# Patient Record
Sex: FEMALE | Race: WHITE | Employment: OTHER | URBAN - METROPOLITAN AREA
[De-identification: names, ages, dates, MRNs, and addresses within clinical notes are randomized per-mention and may not be internally consistent; named-entity substitution may affect disease eponyms.]

---

## 2017-02-13 ENCOUNTER — APPOINTMENT (OUTPATIENT)
Dept: GENERAL RADIOLOGY | Age: 77
End: 2017-02-13
Attending: NURSE PRACTITIONER
Payer: MEDICARE

## 2017-02-13 ENCOUNTER — HOSPITAL ENCOUNTER (OUTPATIENT)
Age: 77
Setting detail: OBSERVATION
Discharge: HOME | End: 2017-02-14
Attending: INTERNAL MEDICINE | Admitting: INTERNAL MEDICINE
Payer: MEDICARE

## 2017-02-13 PROBLEM — Z95.0 S/P CARDIAC PACEMAKER PROCEDURE: Status: ACTIVE | Noted: 2017-02-13

## 2017-02-13 PROBLEM — Z91.199 MEDICALLY NONCOMPLIANT: Status: ACTIVE | Noted: 2017-02-13

## 2017-02-13 PROBLEM — I44.2 CHB (COMPLETE HEART BLOCK) (HCC): Status: ACTIVE | Noted: 2017-02-13

## 2017-02-13 LAB
GLUCOSE BLD STRIP.AUTO-MCNC: 101 MG/DL (ref 65–100)
GLUCOSE BLD STRIP.AUTO-MCNC: 80 MG/DL (ref 65–100)
GLUCOSE BLD STRIP.AUTO-MCNC: 86 MG/DL (ref 65–100)
GLUCOSE BLD STRIP.AUTO-MCNC: 95 MG/DL (ref 65–100)
SERVICE CMNT-IMP: ABNORMAL
SERVICE CMNT-IMP: NORMAL

## 2017-02-13 PROCEDURE — 74011250636 HC RX REV CODE- 250/636: Performed by: NURSE PRACTITIONER

## 2017-02-13 PROCEDURE — 73562 X-RAY EXAM OF KNEE 3: CPT

## 2017-02-13 PROCEDURE — 65390000012 HC CONDITION CODE 44 OBSERVATION

## 2017-02-13 PROCEDURE — C1785 PMKR, DUAL, RATE-RESP: HCPCS

## 2017-02-13 PROCEDURE — 77030011640 HC PAD GRND REM COVD -A

## 2017-02-13 PROCEDURE — 82962 GLUCOSE BLOOD TEST: CPT

## 2017-02-13 PROCEDURE — 99152 MOD SED SAME PHYS/QHP 5/>YRS: CPT

## 2017-02-13 PROCEDURE — 77030031139 HC SUT VCRL2 J&J -A

## 2017-02-13 PROCEDURE — 33228 REMV&REPLC PM GEN DUAL LEAD: CPT

## 2017-02-13 PROCEDURE — 77030018729 HC ELECTRD DEFIB PAD CARD -B

## 2017-02-13 PROCEDURE — 74011250637 HC RX REV CODE- 250/637: Performed by: INTERNAL MEDICINE

## 2017-02-13 PROCEDURE — 51798 US URINE CAPACITY MEASURE: CPT

## 2017-02-13 PROCEDURE — 74011250636 HC RX REV CODE- 250/636

## 2017-02-13 PROCEDURE — 77030002996 HC SUT SLK J&J -A

## 2017-02-13 PROCEDURE — 77030028698 HC BLD TISS PLSM MEDT -D

## 2017-02-13 PROCEDURE — 77030018836 HC SOL IRR NACL ICUM -A

## 2017-02-13 PROCEDURE — 74011000250 HC RX REV CODE- 250

## 2017-02-13 PROCEDURE — 99218 HC RM OBSERVATION: CPT

## 2017-02-13 PROCEDURE — 74011250637 HC RX REV CODE- 250/637: Performed by: NURSE PRACTITIONER

## 2017-02-13 RX ORDER — HEPARIN SODIUM 200 [USP'U]/100ML
500 INJECTION, SOLUTION INTRAVENOUS ONCE
Status: COMPLETED | OUTPATIENT
Start: 2017-02-13 | End: 2017-02-13

## 2017-02-13 RX ORDER — HYDROCODONE BITARTRATE AND ACETAMINOPHEN 5; 325 MG/1; MG/1
1 TABLET ORAL ONCE
Status: COMPLETED | OUTPATIENT
Start: 2017-02-13 | End: 2017-02-13

## 2017-02-13 RX ORDER — ACETAMINOPHEN 325 MG/1
650 TABLET ORAL
Status: DISCONTINUED | OUTPATIENT
Start: 2017-02-13 | End: 2017-02-14 | Stop reason: HOSPADM

## 2017-02-13 RX ORDER — LIDOCAINE HYDROCHLORIDE 10 MG/ML
1-20 INJECTION INFILTRATION; PERINEURAL ONCE
Status: COMPLETED | OUTPATIENT
Start: 2017-02-13 | End: 2017-02-13

## 2017-02-13 RX ORDER — MIDAZOLAM HYDROCHLORIDE 1 MG/ML
INJECTION, SOLUTION INTRAMUSCULAR; INTRAVENOUS
Status: COMPLETED
Start: 2017-02-13 | End: 2017-02-13

## 2017-02-13 RX ORDER — BACITRACIN 50000 [IU]/1
50000 INJECTION, POWDER, FOR SOLUTION INTRAMUSCULAR ONCE
Status: COMPLETED | OUTPATIENT
Start: 2017-02-13 | End: 2017-02-13

## 2017-02-13 RX ORDER — FENTANYL CITRATE 50 UG/ML
INJECTION, SOLUTION INTRAMUSCULAR; INTRAVENOUS
Status: COMPLETED
Start: 2017-02-13 | End: 2017-02-13

## 2017-02-13 RX ORDER — SODIUM CHLORIDE 0.9 % (FLUSH) 0.9 %
5-10 SYRINGE (ML) INJECTION EVERY 8 HOURS
Status: DISCONTINUED | OUTPATIENT
Start: 2017-02-13 | End: 2017-02-14 | Stop reason: HOSPADM

## 2017-02-13 RX ORDER — BACITRACIN 50000 [IU]/1
INJECTION, POWDER, FOR SOLUTION INTRAMUSCULAR
Status: COMPLETED
Start: 2017-02-13 | End: 2017-02-13

## 2017-02-13 RX ORDER — SODIUM CHLORIDE 9 MG/ML
50 INJECTION, SOLUTION INTRAVENOUS CONTINUOUS
Status: DISPENSED | OUTPATIENT
Start: 2017-02-13 | End: 2017-02-14

## 2017-02-13 RX ORDER — HEPARIN SODIUM 200 [USP'U]/100ML
INJECTION, SOLUTION INTRAVENOUS
Status: COMPLETED
Start: 2017-02-13 | End: 2017-02-13

## 2017-02-13 RX ORDER — MIDAZOLAM HYDROCHLORIDE 1 MG/ML
.5-2 INJECTION, SOLUTION INTRAMUSCULAR; INTRAVENOUS
Status: DISCONTINUED | OUTPATIENT
Start: 2017-02-13 | End: 2017-02-13

## 2017-02-13 RX ORDER — SODIUM CHLORIDE 0.9 % (FLUSH) 0.9 %
10 SYRINGE (ML) INJECTION EVERY 8 HOURS
Status: DISCONTINUED | OUTPATIENT
Start: 2017-02-13 | End: 2017-02-14 | Stop reason: HOSPADM

## 2017-02-13 RX ORDER — LIDOCAINE HYDROCHLORIDE 10 MG/ML
INJECTION INFILTRATION; PERINEURAL
Status: COMPLETED
Start: 2017-02-13 | End: 2017-02-13

## 2017-02-13 RX ORDER — ONDANSETRON 2 MG/ML
4 INJECTION INTRAMUSCULAR; INTRAVENOUS
Status: DISCONTINUED | OUTPATIENT
Start: 2017-02-13 | End: 2017-02-14 | Stop reason: HOSPADM

## 2017-02-13 RX ORDER — VANCOMYCIN HYDROCHLORIDE 1 G/20ML
INJECTION, POWDER, LYOPHILIZED, FOR SOLUTION INTRAVENOUS
Status: DISCONTINUED
Start: 2017-02-13 | End: 2017-02-14 | Stop reason: HOSPADM

## 2017-02-13 RX ORDER — SODIUM CHLORIDE 0.9 % (FLUSH) 0.9 %
5-10 SYRINGE (ML) INJECTION AS NEEDED
Status: DISCONTINUED | OUTPATIENT
Start: 2017-02-13 | End: 2017-02-14 | Stop reason: HOSPADM

## 2017-02-13 RX ORDER — SODIUM CHLORIDE 900 MG/100ML
INJECTION INTRAVENOUS
Status: DISCONTINUED
Start: 2017-02-13 | End: 2017-02-14 | Stop reason: HOSPADM

## 2017-02-13 RX ORDER — FENTANYL CITRATE 50 UG/ML
25-50 INJECTION, SOLUTION INTRAMUSCULAR; INTRAVENOUS
Status: DISCONTINUED | OUTPATIENT
Start: 2017-02-13 | End: 2017-02-13

## 2017-02-13 RX ORDER — OXYCODONE AND ACETAMINOPHEN 5; 325 MG/1; MG/1
1 TABLET ORAL
Status: DISCONTINUED | OUTPATIENT
Start: 2017-02-13 | End: 2017-02-14 | Stop reason: HOSPADM

## 2017-02-13 RX ORDER — ATORVASTATIN CALCIUM 20 MG/1
20 TABLET, FILM COATED ORAL DAILY
Qty: 30 TAB | Refills: 11 | Status: SHIPPED | OUTPATIENT
Start: 2017-02-13

## 2017-02-13 RX ORDER — ACETAMINOPHEN 325 MG/1
TABLET ORAL
Status: DISPENSED
Start: 2017-02-13 | End: 2017-02-13

## 2017-02-13 RX ADMIN — MIDAZOLAM HYDROCHLORIDE 2 MG: 1 INJECTION INTRAMUSCULAR; INTRAVENOUS at 09:12

## 2017-02-13 RX ADMIN — ACETAMINOPHEN 650 MG: 325 TABLET, FILM COATED ORAL at 14:12

## 2017-02-13 RX ADMIN — MIDAZOLAM HYDROCHLORIDE 1 MG: 1 INJECTION, SOLUTION INTRAMUSCULAR; INTRAVENOUS at 09:20

## 2017-02-13 RX ADMIN — HEPARIN SODIUM 1000 UNITS: 200 INJECTION, SOLUTION INTRAVENOUS at 09:12

## 2017-02-13 RX ADMIN — LIDOCAINE HYDROCHLORIDE 20 ML: 10 INJECTION INFILTRATION; PERINEURAL at 09:20

## 2017-02-13 RX ADMIN — FENTANYL CITRATE 50 MCG: 50 INJECTION, SOLUTION INTRAMUSCULAR; INTRAVENOUS at 09:19

## 2017-02-13 RX ADMIN — MIDAZOLAM HYDROCHLORIDE 1 MG: 1 INJECTION, SOLUTION INTRAMUSCULAR; INTRAVENOUS at 09:26

## 2017-02-13 RX ADMIN — BACITRACIN 50000 UNITS: 5000 INJECTION, POWDER, FOR SOLUTION INTRAMUSCULAR at 09:23

## 2017-02-13 RX ADMIN — OXYCODONE HYDROCHLORIDE AND ACETAMINOPHEN 1 TABLET: 5; 325 TABLET ORAL at 20:31

## 2017-02-13 RX ADMIN — SODIUM CHLORIDE 250 ML: 900 INJECTION, SOLUTION INTRAVENOUS at 16:32

## 2017-02-13 RX ADMIN — MIDAZOLAM HYDROCHLORIDE 2 MG: 1 INJECTION, SOLUTION INTRAMUSCULAR; INTRAVENOUS at 09:12

## 2017-02-13 RX ADMIN — MIDAZOLAM HYDROCHLORIDE 1 MG: 1 INJECTION, SOLUTION INTRAMUSCULAR; INTRAVENOUS at 09:19

## 2017-02-13 RX ADMIN — LIDOCAINE HYDROCHLORIDE 20 ML: 10 INJECTION, SOLUTION INFILTRATION; PERINEURAL at 09:20

## 2017-02-13 RX ADMIN — FENTANYL CITRATE 50 MCG: 50 INJECTION, SOLUTION INTRAMUSCULAR; INTRAVENOUS at 09:12

## 2017-02-13 RX ADMIN — HYDROCODONE BITARTRATE AND ACETAMINOPHEN 1 TABLET: 5; 325 TABLET ORAL at 11:39

## 2017-02-13 RX ADMIN — BACITRACIN 50000 UNITS: 50000 INJECTION, POWDER, FOR SOLUTION INTRAMUSCULAR at 09:23

## 2017-02-13 RX ADMIN — Medication 10 ML: at 08:35

## 2017-02-13 RX ADMIN — ACETAMINOPHEN 650 MG: 325 TABLET, FILM COATED ORAL at 01:00

## 2017-02-13 RX ADMIN — SODIUM CHLORIDE 1000 MG: 900 INJECTION, SOLUTION INTRAVENOUS at 08:51

## 2017-02-13 NOTE — DISCHARGE INSTRUCTIONS
[address] [place] [address]  [phone]        ICD/PACEMAKER DISCHARGE INSTRUCTIONS    Patient ID:  Katie Hernandez  [id number]  77 y.o.  1940    Admit Date: 2/13/2017    Discharge Date: 2/13/2017     Admitting Physician: Hung Vega MD     Discharge Physician: Armani Jenkins NP    Admission Diagnoses:   Pacemaker malfunction  incomplete heart block  CHB (complete heart block) [facility])    Discharge Diagnoses:   Principal Problem:    CHB (complete heart block) ([facility] [address] (2/13/2017)    Active Problems:    CAD in native artery (11/18/2014)      HTN (hypertension) (11/18/2014)      Diabetes mellitus ([facility] [address] (11/18/2014)      S/P cardiac pacemaker procedure (2/13/2017)      Overview: 2/13/17 Medtronic dual chamber pacemaker generator change      Medically noncompliant (2/13/2017)      Overview: Failed to f/u with Cardiology        Discharge Condition: Good    Cardiology Procedures this Admission:  Medtronic dual chamber pacemaker generator changes    Disposition: home    Reference discharge instructions provided by nursing for diet and activity. Follow-up with Dr. Zane Del Angel in 2 weeks. Please contact the office for an appointment at [phone]. Signed:  Armani Jenkins NP  2/13/2017  2:05 PM    DISCHARGE INSTRUCTIONS FOR PATIENTS WITH ICD'S AND PACEMAKERS    1. Carry you ID card for your ICD/Pacemaker with you at all times. This card will be given to you in the hospital or mailed to you. 2. Medic Alert Bracelets are available from your pharmacist to wear at all times. 3. Call for an appointment in 2 weeks [phone]. 4. The pacemaker will bulge slightly under your skin. An ICD bulges a little more because it is larger. The bulge will decrease in size over the next few weeks. Please notify the doctor's office if you notice any of the following around your ICD site:  A.  A bruise that does not go away  B.   Soreness or yellow, green, or brown drainage from the site.  C. Any swelling from the site. D. If you have a fever of 100 degrees or higher that lasts for a few days    INCISION CARE       1.  Leave dressing over your site until you see the doctor. 2.  Leave steri-strips over your site until they start to fall off.   3.   You may shower after as long as your incision isnt submerged or directly sprayed upon until well healed. 4.  For comfort, wear loose fitting clothing. 5.  Ice pack to affected shoulder for first 24 hours, wear your sling for 2 days. 6.  Report any signs of infection, fever, pain, swelling, redness, oozing, or heat at site especially if these symptoms increase after the first 3 to 4 days. ACTIVITY PRECAUTIONS     1. Avoid rough contact with the implant site. 2. No driving for 14 days. 3. Avoid lifting your arm over your head, carrying anything on the affected side, or lifting over 10 pounds for 30 days. For the first 2 days only bend your arm at the elbow. 4. Any extreme activity such as golf, weight lifting or exercise biking should be restricted for 60 days. 5. Do not carry objects by holding them against your implant site. 6.  No shooting rifles or any type of gun with the affected shoulder permanently. 7.  If you have an ICD, welding and chainsaws are prohibited. SPECIAL PRECAUTIONS     1. You should avoid all strong magnetic fields, such as arc welding, large transformers, large motors. Some ICD devices will beep if it detects a strong magnet. If this occurs, move out of the area. 2.  You may not have an MRI. 3.  Treatments or surgery that requires diathermy or electrocautery should be discussed with your doctor before scheduled. 4. Avoid radio frequency transmitters, including radar. 5. Advise dentist or other medical personnel you see that you have a pacemaker or ICD. 6.  Cell phones and microwave oven use is okay.   7.  If you plan to move or take a trip to a new area, the doctor's office will give you a name of a doctor to contact for any problems. SPECIAL INSTRUCTIONS ON SHOCKS   1. Notify your doctor for any of the following:      A. Anytime a shock is received in a 24 hour period. An office visit is not usually required for a single shock. B.  Two or more shocks in a row. If you do not feel well, call the [facility], otherwise call your doctor. This may require an office visit. C. Two or more shocks spaced apart by several hours. This may require an office visit. 2.  Keep a record of events. Include date, time, symptoms and activity at that time. ANTIBIOTIC THERAPY    During the first 8 weeks after your pacemaker or ICD insertion, you may need antibiotics before any dental work or certain tests or operations. Let the dentist or doctor who is caring for you know that you have had an implanted device.

## 2017-02-13 NOTE — PROGRESS NOTES
1700: WAN Rivera NP aware of low UOP. Only 103 cc's in bladder after approx. 5 hrs. Patient only made 60 cc's of urine from [place] in 6 hrs this AM.  Patient denies feeling need to void. Will start saline at 50 cc/hr for 10 hrs; patient to only be d/c'd after voiding. 1900: Bedside and Verbal shift change report given to MALACHI Velázquez (oncoming nurse) by Justyn Valdes RN (offgoing nurse). Report included the following information SBAR, Kardex, MAR, Accordion and Recent Results.

## 2017-02-13 NOTE — DISCHARGE SUMMARY
[address] [place] [address]  [phone]     Cardiology Discharge Summary     Patient ID:  Marietta Bolden  [id number]  49 y.o.  1940    Admit Date: 2/13/2017    Discharge Date: 2/13/2017     Admitting Physician: Corey Hidalgo MD     Discharge Physician: NELYL Caban MD    Admission Diagnoses:   Pacemaker malfunction  incomplete heart block  CHB (complete heart block) [facility])    Discharge Diagnoses:   Principal Problem:    CHB (complete heart block) ([facility] [address] (2/13/2017)    Active Problems:    CAD in native artery (11/18/2014)      HTN (hypertension) (11/18/2014)      Diabetes mellitus ([facility] [address] (11/18/2014)      S/P cardiac pacemaker procedure (2/13/2017)      Overview: 2/13/17 Medtronic dual chamber pacemaker generator change      Medically noncompliant (2/13/2017)      Overview: Failed to f/u with Cardiology        Discharge Condition: Good    Cardiology Procedures this Admission:  Medtronic dual pacemaker/ICD generator change    Patient Active Problem List    Diagnosis Date Noted    CHB (complete heart block) ([facility] [address] 02/13/2017    S/P cardiac pacemaker procedure 02/13/2017    Medically noncompliant 02/13/2017    Degenerative joint disease of left hip 10/07/2015    Primary localized osteoarthrosis of pelvic region 10/07/2015    Duodenal ulcer with hemorrhage 11/19/2014    GI bleed 11/18/2014     Class: Present on Admission    Anemia, blood loss 11/18/2014     Class: Present on Admission    A-fib ([facility] [address] 11/18/2014     Class: Chronic    CAD in native artery 11/18/2014     Class: Chronic    Presence of stent in coronary artery 11/18/2014     Class: Present on Admission    HTN (hypertension) 11/18/2014     Class: Chronic    Hypokalemia 11/18/2014     Class: Present on Admission    Anxiety 11/18/2014     Class: Chronic    Diarrhea 11/18/2014     Class: Present on Admission    Diabetes mellitus ([address] 11/18/2014     Class: Chronic    Depression 11/18/2014     Class: Chronic    Rhabdomyolysis 11/04/2014    Acute renal failure ([facility] [address] 11/04/2014    S/P coronary artery stent placement 03/15/2013    ASHD (arteriosclerotic heart disease) 03/14/2013    Mobitz (type) II atrioventricular block 05/14/2012    Cardiac pacemaker in situ 05/14/2012    Mitral valve disorders 05/14/2012      Past Medical History   Diagnosis Date    Atrial fibrillation ([facility] [address] 11/5/2014    CAD (coronary artery disease)      stent and pacemaker/icd    Chronic pain      migraines, bilat leg pain    Chronic pain     Diabetes ([facility] [address]     Elevated troponin 11/5/2014    Hypertension     Medically noncompliant 2/13/2017    Mitral valve disorder     Mitral valve disorders     Mobitz (type) II atrioventricular block     Other unknown and unspecified cause of morbidity or mortality 2014     blood transfusion hx    Psychiatric disorder      depression/anxiety    PUD (peptic ulcer disease)     S/P cardiac pacemaker procedure 2/13/2017 2/13/17 Medtronic dual chamber pacemaker implant    Stroke ([facility]     Unspecified essential hypertension       Social History     Social History    Marital status:      Spouse name: N/A    Number of children: N/A    Years of education: N/A     Social History Main Topics    Smoking status: Never Smoker    Smokeless tobacco: Never Used    Alcohol use No    Drug use: No    Sexual activity: Not on file     Other Topics Concern    Not on file     Social History Narrative    ** Merged History Encounter **          Allergies   Allergen Reactions    Influenza Virus Vaccine, Specific Other (comments)     allergy documented in 2014 adm data base    Pcn [Penicillins] Hives      Family History   Problem Relation Age of Onset    Heart Disease Father             Hospital Course: Katie Hernandez is a 77 y.o. female admitted for Pacemaker malfunction, CHB (complete heart block) ([facility] [address] pacemaker battery EOI. Admitted to [facility] with c/SOB, dizziness/lightheadedness. Interrogation of Medtronic pacemaker showed EOI. Telemetry with 2 degree HB. C/o HA and states she takes Hydrocodone for relief - hx of migraines. Previous treatment/evaluation includes Percutaneous Coronary Intervention, echocardiogram and stress thallium . Cardiac risk factors: dyslipidemia, sedentary life style, hypertension    2nd degree HB:  S/p generator changed to pacemaker/ICD. No pacing AV. EF last echo in 2014 EF 55%. Plan to check echo in office for EF.     CAD:  Continues on  ASA, restarting BB, statin. PAF:  Continues on amiodarone for PAF. Patient experienced PAF post hip surgery in 2014. No indication for long term AC. HT:  Stable, continues on Losartan, BB. Discharge to home  Follow up with Dr. Hector Mcmahan in 2 weeks. Follow up with Dr. Paty Pan in 3-4 weeks. Consults: Cardiology    Visit Vitals    /53 (BP 1 Location: Right arm, BP Patient Position: At rest)    Pulse 72    Temp 97.8 °F (36.6 °C)    Resp 16    Wt 81.6 kg (180 lb)    SpO2 98%    BMI 31.89 kg/m2       Physical Exam  Abdomen: soft, non-tender. Bowel sounds normal.   Extremities: L subclavian site, D/I, extremities normal, no edema, pulses   Heart: regular rate and rhythm, no murmur, S3/JVD  Lungs: clear to auscultation bilaterally  Neck: supple, symmetrical, trachea midline,   Neurologic: Grossly normal    Labs:   EKG: paced rhythm    Disposition: home    Patient Instructions:   Current Discharge Medication List      START taking these medications    Details   atorvastatin (LIPITOR) 20 mg tablet Take 1 Tab by mouth daily. Qty: 30 Tab, Refills: 11         CONTINUE these medications which have NOT CHANGED    Details   oxyCODONE-acetaminophen (PERCOCET) 7.5-325 mg per tablet Take 1 Tab by mouth every four (4) hours as needed for Pain. LORazepam (ATIVAN) 1 mg tablet Take  by mouth two (2) times a day. losartan (COZAAR) 50 mg tablet Take 50 mg by mouth daily.       METFORMIN HCL (METFORMIN PO) Take by mouth as needed. Only takes when blood sugar greater than 150 twice daily. amiodarone (CORDARONE) 200 mg tablet Take 1 Tab by mouth daily. Qty: 30 Tab, Refills: 12      cholecalciferol (VITAMIN D3) 5,000 unit capsule Take 5,000 Units by mouth daily. Refills: 0    Associated Diagnoses: A-fib ([address]      acetaminophen (TYLENOL) 325 mg tablet Take  by mouth every four (4) hours as needed for Pain.      metoprolol (LOPRESSOR) 25 mg tablet Take 1 tablet by mouth two (2) times a day. Qty: 60 tablet, Refills: 0    Comments: Hold if sbp below 90 or HR below 60             Referenced discharge instructions provided by nursing for diet and activity.     Signed:  Shelly Medrano NP  2/13/2017  2:14 PM

## 2017-02-13 NOTE — PROCEDURES
[address] [place] [address]  [phone]    Indications and Pre-Procedure Diagnosis:  Lisy Randle is a 77 y.o. female with complete heart block with pacemaker at EOL is referred for dual chamber pacemaker generator change. Post Procedure Diagnosis:  Complete heart block    Pacemaker Implant Procedure and Findings:  Informed consent was obtained and the patient was premedicated with cefazolin. The procedure was performed under local anesthesia. Continuous pulse oximetry and cuff pressure were monitored. During the procedure, the patient received Versed and Fentanyl for sedation per nursing personnel. The left deltopectoral area was prepped and draped in the usual sterile fashion and was liberally infiltrated with 1% lidocaine. An incision was made over the old wound and the device was explanted without difficulty. In testing of the leads, sensing and pacing were assessed. The chronic leads were then removed from the old generator and connected to the new pulse generator. The pulse generator pocket was then liberally infiltrated with bacitracin soluation, and the device implanted with a single silk fixation suture in the header to prevent migration. The wound was closed in layers using continuous 2-0 Vicryl and 4-0 Vicryl ending with a sub-cuticular closure. Fluoroscopy and total procedure times were 0.1 and 15 minutes respectively. Estimated blood loss <10 ml. Sharp count: correct. Specimen(s) collected: none. The following procedure related complication occurred: none. The following problems were encountered: none. Findings: successful pacemaker replacement.     Device Data Measurements:  Lead Sensing (mV) Threshold (V)Pulse Width (ms) Impedance (Ohms)  RA 4  0.75  0.4   333  RV paced  0.75  0.4   671    Final Programmed Parameters  Bradycardia pacing rate  60 bpm  Pacing Mode    DDDR  Pacing Output    3.5 V@ 0.5 ms    Supplies Summary available in the chart  Posiqtronic    Thank you for allowing me to participate in this patients care.     Antonio Love MD, Cathy Camacho

## 2017-02-13 NOTE — PROGRESS NOTES
Care Management:     Natividad Lee is a 77 y.o. female with complete heart block with pacemaker at EOL is referred for dual chamber pacemaker generator change. She is discharging today and grand DTR Morene Lefort is transporting home. I have scheduled her a follow up with Dr Leopoldo Drone in the [place] office. Patient will call and schedule follow up with Dr Stephen Ayala.     I have encouraged patient to be compliant with MD appointments. She uses [employer] in [facility]. I have given her a card for reduced price Lisinopril. Care Management Interventions  PCP Verified by CM: Yes  Mode of Transport at Discharge: Other (see comment) (grand DTR Liliam)  Current Support Network: Relative's Home (Lives with her DTR and her DTR's family. Independent with ADLs. She has a cane. )  Confirm Follow Up Transport: Family  Discharge Location  Discharge Placement: Home (Home with MD follow up. )     Jaime corona [facility] [phone]    Addendum: Code 44 and OBS letter given to patient and copy placed on chart.

## 2017-02-13 NOTE — H&P
[address] [place] [address] [facility]  [phone]          CARDIOLOGY HISTORY AND PHYSICAL    Date of  Admission: 2/13/2017 12:52 AM     Admission type:Elective     Subjective:      Saira Diggs is a 77 y.o. female admitted for Pacemaker malfunction, CHB (complete heart block) [facility]), pacemaker battery EOI. Admitted to [facility] with c/SOB, dizziness/lightheadedness. Interrogation of Medtronic pacemaker showed EOI. Telemetry with 2 degree HB. C/o HA and states she takes Hydrocodone for relief - hx of migraines. Previous treatment/evaluation includes Percutaneous Coronary Intervention, echocardiogram and stress thallium . Cardiac risk factors: dyslipidemia, sedentary life style, hypertension.     Patient Active Problem List    Diagnosis Date Noted    CHB (complete heart block) ([facility] [address] 02/13/2017    Degenerative joint disease of left hip 10/07/2015    Primary localized osteoarthrosis of pelvic region 10/07/2015    Duodenal ulcer with hemorrhage 11/19/2014    GI bleed 11/18/2014     Class: Present on Admission    Anemia, blood loss 11/18/2014     Class: Present on Admission    A-fib ([address] 11/18/2014     Class: Chronic    CAD in native artery 11/18/2014     Class: Chronic    Presence of stent in coronary artery 11/18/2014     Class: Present on Admission    HTN (hypertension) 11/18/2014     Class: Chronic    Hypokalemia 11/18/2014     Class: Present on Admission    Anxiety 11/18/2014     Class: Chronic    Diarrhea 11/18/2014     Class: Present on Admission    Diabetes mellitus ([address] 11/18/2014     Class: Chronic    Depression 11/18/2014     Class: Chronic    Rhabdomyolysis 11/04/2014    Acute renal failure ([address] 11/04/2014    S/P coronary artery stent placement 03/15/2013    ASHD (arteriosclerotic heart disease) 03/14/2013    Mobitz (type) II atrioventricular block 05/14/2012    Cardiac pacemaker in situ 05/14/2012    Mitral valve disorders 05/14/2012      Dayday JOYNER Lawson Ahuja MD  Past Medical History   Diagnosis Date    Atrial fibrillation [facility]) 11/5/2014    CAD (coronary artery disease)      stent and pacemaker/icd    Chronic pain      migraines, bilat leg pain    Chronic pain     Diabetes ([facility]     Elevated troponin 11/5/2014    Hypertension     Mitral valve disorder     Mitral valve disorders     Mobitz (type) II atrioventricular block     Other unknown and unspecified cause of morbidity or mortality 2014     blood transfusion hx    Psychiatric disorder      depression/anxiety    PUD (peptic ulcer disease)     Stroke ([facility]     Unspecified essential hypertension       Social History     Social History    Marital status:      Spouse name: N/A    Number of children: N/A    Years of education: N/A     Social History Main Topics    Smoking status: Never Smoker    Smokeless tobacco: Never Used    Alcohol use No    Drug use: No    Sexual activity: Not on file     Other Topics Concern    Not on file     Social History Narrative    ** Merged History Encounter **          Allergies   Allergen Reactions    Influenza Virus Vaccine, Specific Other (comments)     allergy documented in 2014 adm data base    Pcn [Penicillins] Hives      Family History   Problem Relation Age of Onset    Heart Disease Father       Current Facility-Administered Medications   Medication Dose Route Frequency    ondansetron (ZOFRAN) injection 4 mg  4 mg IntraVENous Q6H PRN    acetaminophen (TYLENOL) tablet 650 mg  650 mg Oral Q4H PRN    acetaminophen (TYLENOL) 325 mg tablet        SALINE PERIPHERAL FLUSH Q8H soln 10 mL  10 mL InterCATHeter Q8H    vancomycin (VANCOCIN) 1,000 mg in 0.9% sodium chloride (MBP/ADV) 250 mL adv  1,000 mg IntraVENous ON CALL         Review of Symptoms:  Constitutional: negative  Eyes: negative  Ears, nose, mouth, throat, and face: negative  Respiratory: increased HORNER over last 2 weeks  Cardiovascular: negative  Gastrointestinal: negative  Genitourinary:negative  Musculoskeletal:weakness  Neurological: negative  Behvioral/Psych: negative  Endocrine: negative     Objective:   Physical Exam:  General Appearance:  elderly  female in no acute distress  Chest:   Clear  Cardiovascular:  cherry no murmur.   Abdomen:   Soft, non-tender, bowel sounds are active.   Extremities: no peripheral edema  Skin:  Warm and dry.     Visit Vitals    /48    Pulse (!) 33    Temp 98.4 °F (36.9 °C)    Resp 14    SpO2 100%       Cardiographics    Telemetry: [facility]  Echocardiogram: 2014 EF 55%, mild MR, TR    Labs:  Per [facility]        Assessment:       Active Problems:    CHB (complete heart block) ([facility] [address] (2/13/2017)         Plan:     CHB with EOI pacemaker:  Plan for battery change today. Resume home meds. Likely home today. Echo in office.

## 2017-02-13 NOTE — CONSULTS
[address] [address]  [phone]      EP CARDIOLOGY CONSULT     Date of  Admission: 2/13/2017 12:52 AM     Admission type:Elective    Consult for:  Consult by: Subjective:     Filiberto Jackson is a 77 y.o. female admitted for Pacemaker malfunction, EOI, complete heart block. Patient presented to [facility] with c/o SOB, dizziness, interrogation of device showed EOI. Patient has not been seen by Cardiology for 2 years. Denies CP.   Previous treatment/evaluation includes Percutaneous Coronary Intervention and echocardiogram . Cardiac risk factors: dyslipidemia, sedentary life style, hypertension, post-menopausal.    Patient Active Problem List    Diagnosis Date Noted    CHB (complete heart block) ([address] 02/13/2017    Degenerative joint disease of left hip 10/07/2015    Primary localized osteoarthrosis of pelvic region 10/07/2015    Duodenal ulcer with hemorrhage 11/19/2014    GI bleed 11/18/2014     Class: Present on Admission    Anemia, blood loss 11/18/2014     Class: Present on Admission    A-fib ([address] 11/18/2014     Class: Chronic    CAD in native artery 11/18/2014     Class: Chronic    Presence of stent in coronary artery 11/18/2014     Class: Present on Admission    HTN (hypertension) 11/18/2014     Class: Chronic    Hypokalemia 11/18/2014     Class: Present on Admission    Anxiety 11/18/2014     Class: Chronic    Diarrhea 11/18/2014     Class: Present on Admission    Diabetes mellitus ([address] 11/18/2014     Class: Chronic    Depression 11/18/2014     Class: Chronic    Rhabdomyolysis 11/04/2014    Acute renal failure ([address] 11/04/2014    S/P coronary artery stent placement 03/15/2013    ASHD (arteriosclerotic heart disease) 03/14/2013    Mobitz (type) II atrioventricular block 05/14/2012    Cardiac pacemaker in situ 05/14/2012    Mitral valve disorders 05/14/2012      Levi Beasley MD  Past Medical History   Diagnosis Date    Atrial fibrillation ([facility] 11/5/2014    CAD (coronary artery disease)      stent and pacemaker/icd    Chronic pain      migraines, bilat leg pain    Chronic pain     Diabetes ([facility]     Elevated troponin 11/5/2014    Hypertension     Mitral valve disorder     Mitral valve disorders     Mobitz (type) II atrioventricular block     Other unknown and unspecified cause of morbidity or mortality 2014     blood transfusion hx    Psychiatric disorder      depression/anxiety    PUD (peptic ulcer disease)     Stroke ([facility]     Unspecified essential hypertension       Social History     Social History    Marital status:      Spouse name: N/A    Number of children: N/A    Years of education: N/A     Social History Main Topics    Smoking status: Never Smoker    Smokeless tobacco: Never Used    Alcohol use No    Drug use: No    Sexual activity: Not on file     Other Topics Concern    Not on file     Social History Narrative    ** Merged History Encounter **          Allergies   Allergen Reactions    Influenza Virus Vaccine, Specific Other (comments)     allergy documented in 2014 adm data base    Pcn [Penicillins] Hives      Family History   Problem Relation Age of Onset    Heart Disease Father       Current Facility-Administered Medications   Medication Dose Route Frequency    ondansetron (ZOFRAN) injection 4 mg  4 mg IntraVENous Q6H PRN    acetaminophen (TYLENOL) tablet 650 mg  650 mg Oral Q4H PRN    acetaminophen (TYLENOL) 325 mg tablet        SALINE PERIPHERAL FLUSH Q8H soln 10 mL  10 mL InterCATHeter Q8H    vancomycin (VANCOCIN) 1,000 mg in 0.9% sodium chloride (MBP/ADV) 250 mL adv  1,000 mg IntraVENous ON CALL    heparinized saline 2 units/mL infusion 1,000 Units  500 mL Irrigation ONCE    midazolam (VERSED) injection 0.5-2 mg  0.5-2 mg IntraVENous Multiple    fentaNYL citrate (PF) injection 25-50 mcg  25-50 mcg IntraVENous Multiple    lidocaine (XYLOCAINE) 10 mg/mL (1 %) injection 1-20 mL 1-20 mL IntraDERMal ONCE    bacitracin injection 50,000 Units  50,000 Units Irrigation ONCE    vancomycin (VANCOCIN) 1,000 mg in 0.9% sodium chloride (MBP/ADV) 250 mL  1,000 mg IntraVENous ONCE    midazolam (VERSED) 1 mg/mL injection        fentaNYL citrate (PF) 50 mcg/mL injection        lidocaine (XYLOCAINE) 10 mg/mL (1 %) injection        vancomycin (VANCOCIN) 1,000 mg injection        0.9% sodium chloride (MBP/ADV) 0.9 % infusion        heparinized saline 2 units/mL 1,000 unit/500 mL infusion        bacitracin 50,000 unit injection             Review of Symptoms:  Constitutional: negative  Eyes: negative  Ears, nose, mouth, throat, and face: negative  Respiratory: increased HORNER  Cardiovascular: negative  Gastrointestinal: negative  Genitourinary:negative  Musculoskeletal:negative  Neurological: dizziness  Endocrine: negative     Subjective:      Visit Vitals    /48    Pulse (!) 33    Temp 98.4 °F (36.9 °C)    Resp 14    SpO2 100%       Physical:  General:  Elderly  female in no acute distress  Heart: cherry no m/S3/JVD, no carotid bruits   Lungs: clear   Abdomen: Soft, +BS, NTND   Extremities: LE marilyn +DP/PT, no edema   Neurologic: Grossly normal    Data Review:   Labs per [facility]      Intake/Output Summary (Last 24 hours) at 02/13/17 0845  Last data filed at 02/13/17 0648   Gross per 24 hour   Intake                0 ml   Output              300 ml   Net             -300 ml        Cardiographics    Telemetry: CHB        Assessment:       Active Problems:    CHB (complete heart block) ([facility] (2/13/2017)         Plan:     Yessenia Bertrand is a pleasant lady with complete heart block and pacemaker at EOL. She is a candidate for battery change of pacemaker. Dr. Aye Minaya and I discussed the risks/benefits/alternatives of the procedure with the patient. Risks include (but are not limited to) bleeding, infection, cva/mi/death. The patient understands and would like to proceed.  Thank you for this interesting consultation. Pt seen and examined. Agree with assessment and plan as documented above.     Aggie Lai MD, Yulissa Ortega

## 2017-02-13 NOTE — IP AVS SNAPSHOT
[address] [facility] 
[phone] Patient: Petra Guevara MRN: [id number] You are allergic to the following Allergen Reactions Influenza Virus Vaccine, Specific Other (comments) allergy documented in 2014 adm data base Pcn (Penicillins) Hives Recent Documentation Weight BMI OB Status Smoking Status 81.6 kg 31.89 kg/m2 Postmenopausal Never Smoker Emergency Contacts Name Discharge Info Relation Home Work Mobile Agnes Corley  Child [2] [phone] Liliam Bermeo  Other Relative [6] [phone] About your hospitalization You were admitted on:  February 13, 2017 You last received care in the:  [facility] 2 INTRVNTNL CARDIO You were discharged on:  February 13, 2017 Unit phone number:  [phone] Why you were hospitalized Your primary diagnosis was:  Chb (Complete Heart Block) (Hcc) Your diagnoses also included:  S/P Cardiac Pacemaker Procedure, Medically Noncompliant, Htn (Hypertension), Diabetes Mellitus (Hcc), Cad In Native Artery Providers Seen During Your Hospitalizations Provider Role Specialty Primary office phone Dior Torres MD Attending Provider Cardiology [phone] Bobby Ponce MD Attending Provider Cardiology [phone] Your Primary Care Physician (PCP) Primary Care Physician Office Phone Office Fax Solangedavid Álvaro [phone] Follow-up Information Follow up With Details Comments Contact Info Bobby Ponce MD Schedule an appointment as soon as possible for a visit in 2 weeks post procedure [address] [facility] 
[phone] Ramírez Bennett MD  Please schedule an appointment for 1-3 weeks post hospital.  [address] [place] [address] [address] [address] 
[phone] Pradip Box MD Go on 3/21/2017 [facility] , [address] [address]. The appointment is 11:30 on March 21st.  [address] [place] 
[phone] Your Appointments Tuesday March 21, 2017 11:30 AM T HOSPITAL FOLLOW-UP with Pradip Box MD  
[facility] [address] [place] ([facility]) [address] [facility] [place] [address]  
[phone] Current Discharge Medication List  
  
START taking these medications Dose & Instructions Dispensing Information Comments Morning Noon Evening Bedtime  
 atorvastatin 20 mg tablet Commonly known as:  LIPITOR Your next dose is: Today, Tomorrow Other:  _________ Dose:  20 mg Take 1 Tab by mouth daily. Quantity:  30 Tab Refills:  11 CONTINUE these medications which have CHANGED Dose & Instructions Dispensing Information Comments Morning Noon Evening Bedtime  
 losartan 50 mg tablet Commonly known as:  COZAAR What changed:  Another medication with the same name was removed. Continue taking this medication, and follow the directions you see here. Your next dose is: Today, Tomorrow Other:  _________ Dose:  50 mg Take 50 mg by mouth daily. Refills:  0 CONTINUE these medications which have NOT CHANGED Dose & Instructions Dispensing Information Comments Morning Noon Evening Bedtime  
 amiodarone 200 mg tablet Commonly known as:  CORDARONE Your next dose is: Today, Tomorrow Other:  _________ Dose:  200 mg Take 1 Tab by mouth daily. Quantity:  30 Tab Refills:  12  
     
   
   
   
  
 ATIVAN 1 mg tablet Generic drug:  LORazepam  
   
Your next dose is: Today, Tomorrow Other:  _________ Take  by mouth two (2) times a day. Refills:  0 cholecalciferol 5,000 unit capsule Commonly known as:  VITAMIN D3 Your next dose is: Today, Tomorrow Other:  _________ Dose:  5000 Units Take 5,000 Units by mouth daily. Refills:  0 METFORMIN PO Your next dose is: Today, Tomorrow Other:  _________ Take  by mouth as needed. Only takes when blood sugar greater than 150 twice daily. Refills:  0  
     
   
   
   
  
 metoprolol tartrate 25 mg tablet Commonly known as:  LOPRESSOR Your next dose is: Today, Tomorrow Other:  _________ Dose:  25 mg Take 1 tablet by mouth two (2) times a day. Quantity:  60 tablet Refills:  0 Hold if sbp below 90 or HR below 60 PERCOCET 7.5-325 mg per tablet Generic drug:  oxyCODONE-acetaminophen Your next dose is: Today, Tomorrow Other:  _________ Dose:  1 Tab Take 1 Tab by mouth every four (4) hours as needed for Pain. Refills:  0  
     
   
   
   
  
 TYLENOL 325 mg tablet Generic drug:  acetaminophen Your next dose is: Today, Tomorrow Other:  _________ Take  by mouth every four (4) hours as needed for Pain. Refills:  0 Where to Get Your Medications These medications were sent to [address] [place] [address] Phone:  [phone]  
  atorvastatin 20 mg tablet Discharge Instructions [address] [place] [address]  [phone] ICD/PACEMAKER DISCHARGE INSTRUCTIONS Patient ID: 
Yessenia Bertrand 
[id number] 
77 y.o. 
1940 Admit Date: 2/13/2017 Discharge Date: 2/13/2017 Admitting Physician: Makayla Burnett MD  
 
Discharge Physician: Shelly Medrano NP Admission Diagnoses:  
Pacemaker malfunction 
incomplete heart block CHB (complete heart block) ([facility]) Discharge Diagnoses:  
Principal Problem: 
  CHB (complete heart block) ([facility] (2/13/2017) Active Problems: 
  CAD in native artery (11/18/2014) HTN (hypertension) (11/18/2014) Diabetes mellitus ([facility] (11/18/2014) S/P cardiac pacemaker procedure (2/13/2017) Overview: 2/13/17 Medtronic dual chamber pacemaker generator change Medically noncompliant (2/13/2017) Overview: Failed to f/u with Cardiology Discharge Condition: Good Cardiology Procedures this Admission:  Medtronic dual chamber pacemaker generator changes Disposition: home Reference discharge instructions provided by nursing for diet and activity. Follow-up with Dr. Aye Minaya in 2 weeks. Please contact the office for an appointment at [phone]. Signed: 
Shelly Medrano NP 
2/13/2017 
2:05 PM 
 
DISCHARGE INSTRUCTIONS FOR PATIENTS WITH ICD'S AND PACEMAKERS 1. Carry you ID card for your ICD/Pacemaker with you at all times. This card will be given to you in the hospital or mailed to you. 2. Medic Alert Bracelets are available from your pharmacist to wear at all times. 3. Call for an appointment in 2 weeks [phone]. 4. The pacemaker will bulge slightly under your skin. An ICD bulges a little more because it is larger. The bulge will decrease in size over the next few weeks. Please notify the doctor's office if you notice any of the following around your ICD site: A.  A bruise that does not go away B. Soreness or yellow, green, or brown drainage from the site. C. Any swelling from the site. D. If you have a fever of 100 degrees or higher that lasts for a few days INCISION CARE 1.  Leave dressing over your site until you see the doctor. 2.  Leave steri-strips over your site until they start to fall off.  
3.   You may shower after as long as your incision isnt submerged or directly sprayed upon until well healed. 4.  For comfort, wear loose fitting clothing. 5.  Ice pack to affected shoulder for first 24 hours, wear your sling for 2 days. 6.  Report any signs of infection, fever, pain, swelling, redness, oozing, or heat at site especially if these symptoms increase after the first 3 to 4 days. ACTIVITY PRECAUTIONS 1. Avoid rough contact with the implant site. 2. No driving for 14 days. 3. Avoid lifting your arm over your head, carrying anything on the affected side, or lifting over 10 pounds for 30 days. For the first 2 days only bend your arm at the elbow. 4. Any extreme activity such as golf, weight lifting or exercise biking should be restricted for 60 days. 5. Do not carry objects by holding them against your implant site. 6.  No shooting rifles or any type of gun with the affected shoulder permanently. 7.  If you have an ICD, welding and chainsaws are prohibited. SPECIAL PRECAUTIONS 1. You should avoid all strong magnetic fields, such as arc welding, large transformers, large motors. Some ICD devices will beep if it detects a strong magnet. If this occurs, move out of the area. 2.  You may not have an MRI. 3.  Treatments or surgery that requires diathermy or electrocautery should be discussed with your doctor before scheduled. 4. Avoid radio frequency transmitters, including radar. 5. Advise dentist or other medical personnel you see that you have a pacemaker or ICD. 6.  Cell phones and microwave oven use is okay. 7.  If you plan to move or take a trip to a new area, the doctor's office will give you a name of a doctor to contact for any problems. SPECIAL INSTRUCTIONS ON SHOCKS 1. Notify your doctor for any of the following: A. Anytime a shock is received in a 24 hour period. An office visit is not usually required for a single shock. B.  Two or more shocks in a row.   If you do not feel well, call the Rescue Squad, otherwise call your doctor. This may require an office visit. C. Two or more shocks spaced apart by several hours. This may require an office visit. 2.  Keep a record of events. Include date, time, symptoms and activity at that time. ANTIBIOTIC THERAPY During the first 8 weeks after your pacemaker or ICD insertion, you may need antibiotics before any dental work or certain tests or operations. Let the dentist or doctor who is caring for you know that you have had an implanted device. Discharge Orders None [email or web address] Announcement We are excited to announce that we are making your provider's discharge notes available to you in [email or web address]. You will see these notes when they are completed and signed by the physician that discharged you from your recent hospital stay. If you have any questions or concerns about any information you see in [email or web address], please call the Health Information Department where you were seen or reach out to your Primary Care Provider for more information about your plan of care. Introducing [facility] [place] [facility]! [facility] introduces [employer] patient portal. Now you can access parts of your medical record, email your doctor's office, and request medication refills online. 1. In your internet browser, go to [email or web address] 2. Click on the First Time User? Click Here link in the Sign In box. You will see the New Member Sign Up page. 3. Enter your [email or web address] Access Code exactly as it appears below. You will not need to use this code after youve completed the sign-up process. If you do not sign up before the expiration date, you must request a new code. · [email or web address] Access Code: [email or web address] Expires: 5/14/2017 12:39 AM 
 
4. Enter the last four digits of your Social Security Number (xxxx) and Date of Birth (mm/dd/yyyy) as indicated and click Submit. You will be taken to the next sign-up page. 5. Create a [employer] ID. This will be your [employer] login ID and cannot be changed, so think of one that is secure and easy to remember. 6. Create a [employer] password. You can change your password at any time. 7. Enter your Password Reset Question and Answer. This can be used at a later time if you forget your password. 8. Enter your e-mail address. You will receive e-mail notification when new information is available in [address] 9. Click Sign Up. You can now view and download portions of your medical record. 10. Click the Download Summary menu link to download a portable copy of your medical information. If you have questions, please visit the Frequently Asked Questions section of the [employer] website. Remember, [employer] is NOT to be used for urgent needs. For medical emergencies, dial 911. Now available from your iPhone and Android! General Information Please provide this summary of care documentation to your next provider. Patient Signature:  ____________________________________________________________ Date:  ____________________________________________________________  
  
Aloma Snellen Provider Signature:  ____________________________________________________________ Date:  ____________________________________________________________

## 2017-02-14 VITALS
BODY MASS INDEX: 31.89 KG/M2 | DIASTOLIC BLOOD PRESSURE: 90 MMHG | OXYGEN SATURATION: 95 % | TEMPERATURE: 98.4 F | WEIGHT: 180 LBS | RESPIRATION RATE: 18 BRPM | HEART RATE: 83 BPM | SYSTOLIC BLOOD PRESSURE: 177 MMHG

## 2017-02-14 PROBLEM — M25.562 ACUTE PAIN OF LEFT KNEE: Status: ACTIVE | Noted: 2017-02-14

## 2017-02-14 LAB
GLUCOSE BLD STRIP.AUTO-MCNC: 81 MG/DL (ref 65–100)
SERVICE CMNT-IMP: NORMAL

## 2017-02-14 PROCEDURE — 82962 GLUCOSE BLOOD TEST: CPT

## 2017-02-14 PROCEDURE — 74011250637 HC RX REV CODE- 250/637: Performed by: INTERNAL MEDICINE

## 2017-02-14 PROCEDURE — 99218 HC RM OBSERVATION: CPT

## 2017-02-14 RX ORDER — HYDROMORPHONE HYDROCHLORIDE 2 MG/1
1 TABLET ORAL
Qty: 10 TAB | Refills: 0 | Status: SHIPPED | OUTPATIENT
Start: 2017-02-14

## 2017-02-14 RX ADMIN — OXYCODONE HYDROCHLORIDE AND ACETAMINOPHEN 1 TABLET: 5; 325 TABLET ORAL at 00:21

## 2017-02-14 RX ADMIN — OXYCODONE HYDROCHLORIDE AND ACETAMINOPHEN 1 TABLET: 5; 325 TABLET ORAL at 16:26

## 2017-02-14 RX ADMIN — OXYCODONE HYDROCHLORIDE AND ACETAMINOPHEN 1 TABLET: 5; 325 TABLET ORAL at 12:27

## 2017-02-14 RX ADMIN — OXYCODONE HYDROCHLORIDE AND ACETAMINOPHEN 1 TABLET: 5; 325 TABLET ORAL at 04:12

## 2017-02-14 RX ADMIN — OXYCODONE HYDROCHLORIDE AND ACETAMINOPHEN 1 TABLET: 5; 325 TABLET ORAL at 08:33

## 2017-02-14 RX ADMIN — OXYCODONE HYDROCHLORIDE AND ACETAMINOPHEN 1 TABLET: 5; 325 TABLET ORAL at 20:07

## 2017-02-14 NOTE — CARDIO/PULMONARY
CP Rehab Note: Chart Review    Admit: pacemaker malfunction    Mhx: CAD, stents x2, DM, HTN, PUD, pacemaker, chronic pain, depression, stroke    Non-smoker. Patient to be discharged yesterday but low UOP, IV fluids started. Ortho consult also ordered. Patient fell PTA. Chart reviewed and pt visited. Printed material given and discussed re: pacemakers, pacemaker discharge instructions and the TLC diet. Discussed post pacemaker instructions including: restrictions for the affected arm (no raising the arm above shoulder level, no heavy lifting for 30 days), monitoring for infection, avoiding impacts/pressure to the site, avoiding extreme activities, when to call the doctor, use of cell phones and microwaves and avoiding strong magnetic fields. Provided patient with information on low sodium diet, salty six and spice your life handout as she indicated she cooks much of her food herself. Patient indicated that orthopedic to look at her knee this afternoon and hopefully be discharged. Pt verbalized understanding and had no additional questions at this time.

## 2017-02-14 NOTE — PROGRESS NOTES
[address] [place] [address]  [phone]      Cardiology Progress Note      2/14/2017 9:15 AM    Admit Date: 2/13/2017    Admit Diagnosis:   Pacemaker malfunction  incomplete heart block  CHB (complete heart block) (HCC)  CHB (complete heart block) (HCC)    Subjective:     Katie Hernandez is s/p pacemaker generator change. Yesterday when ambulating had pain in left knee requiring 2 person assist to walk. Assessment showed tenderness and bruising posterior of the knee. Xray:  No acute bony abnormality. Small to moderate knee joint effusion. Patient states that when she had syncopal episode did fall down on her knees.   Ortho consult pending.       Visit Vitals    /90 (BP 1 Location: Right arm, BP Patient Position: At rest)    Pulse 87    Temp 98.3 °F (36.8 °C)    Resp 18    Wt 81.6 kg (180 lb)    SpO2 98%    BMI 31.89 kg/m2       Current Facility-Administered Medications   Medication Dose Route Frequency    ondansetron (ZOFRAN) injection 4 mg  4 mg IntraVENous Q6H PRN    acetaminophen (TYLENOL) tablet 650 mg  650 mg Oral Q4H PRN    SALINE PERIPHERAL FLUSH Q8H soln 10 mL  10 mL InterCATHeter Q8H    vancomycin (VANCOCIN) 1,000 mg injection        0.9% sodium chloride (MBP/ADV) 0.9 % infusion        sodium chloride (NS) flush 5-10 mL  5-10 mL IntraVENous Q8H    sodium chloride (NS) flush 5-10 mL  5-10 mL IntraVENous PRN    acetaminophen (TYLENOL) tablet 650 mg  650 mg Oral Q4H PRN    oxyCODONE-acetaminophen (PERCOCET) 5-325 mg per tablet 1 Tab  1 Tab Oral Q4H PRN       Objective:      Physical Exam:  General Appearance:  elderly  female in no acute distress  Chest:   Clear  Cardiovascular:  paced  no murmur.   Abdomen:   Soft, non-tender, bowel sounds are active.   Extremities: no peripheral edema:  Left subclavian site D/I  Skin:  Warm and dry.     Data Review:   No results for input(s): WBC, HGB, HCT, PLT, HGBEXT, HCTEXT, PLTEXT, HGBEXT, HCTEXT, PLTEXT in the last 72 hours. No results for input(s): NA, K, CL, CO2, GLU, BUN, CREA, CA, MG, PHOS, ALB, TBIL, TBILI, SGOT, ALT, INR in the last 72 hours. No lab exists for component: INREXT, INREXT    No results for input(s): TROIQ, CPK, CKMB in the last 72 hours. Intake/Output Summary (Last 24 hours) at 02/14/17 1113  Last data filed at 02/13/17 1800   Gross per 24 hour   Intake             1250 ml   Output              210 ml   Net             1040 ml        Telemetry: AV paced       Assessment:     Principal Problem:    CHB (complete heart block) ([facility] (2/13/2017)    Active Problems:    CAD in native artery (11/18/2014)      HTN (hypertension) (11/18/2014)      Diabetes mellitus ([facility] (11/18/2014)      S/P cardiac pacemaker procedure (2/13/2017)      Overview: 2/13/17 Medtronic dual chamber pacemaker generator change      Medically noncompliant (2/13/2017)      Overview: Failed to f/u with Cardiology      Acute pain of left knee (2/14/2017)        Plan:     CHB with EOI of pacemaker:  Generator changed yesterday, AV paced. Left Knee pain:  Possibly r/t syncopal episode with fall, ortho consult pending. Concerns that the effusion may require draining. Plan for discharge to home today with f/u to Dr. Rebecca Zelaya 2 weeks and Dr. Koki Paez with 1 month.

## 2017-02-14 NOTE — CONSULTS
Patient known to DR Juaquin Vásquez. Had joint replacement 2 years ago. Had done well. [place] couple days ago and has left shoulder and knee pain with bruises. Does no recall the fall. Had successful pacemaker placed this morning. Pain in knee is posterior and has difficulty with ambulation. Patient Vitals for the past 24 hrs:   Temp Pulse Resp BP SpO2   02/14/17 1515 98.4 °F (36.9 °C) 83 18 177/90 95 %   02/14/17 1047 98.1 °F (36.7 °C) 74 18 158/85 99 %   02/14/17 0730 98.3 °F (36.8 °C) 87 18 170/90 98 %   02/14/17 0300 98.1 °F (36.7 °C) 77 18 168/72 97 %   02/13/17 1850 97.8 °F (36.6 °C) 76 16 171/81 100 %     Left elbow bruise noted posterior. Full ROM. Left knee with bruise posterior. TTP. Mild knee effusion. Knee is stable on exam    Xray :  Final result (Exam End: 2/13/2017  7:40 PM) Open    Study Result   EXAM: XR KNEE LT 3 V     INDICATION: L knee pain post-fall from syncope due to complete heart block.     COMPARISON: 11/5/2014.     FINDINGS: Three views of the left knee demonstrate no fracture or other acute  osseous or articular abnormality. There is generalized osteopenia which is  increased. Mild degenerative change, also increased. There is a small to  moderate knee joint effusion, new. .     IMPRESSION  IMPRESSION: No acute bony abnormality. Small to moderate knee joint effusion.      Left knee contusion    WBAT with walker  Ice elevate  Follow up with DR Juaquin Vásquez if not improving over the next week      Increase activity as tolerated

## 2017-02-15 NOTE — PROGRESS NOTES
1900: Patient still on IVCU after multiple attempts to have family  patient for D/C to home. Per son, Ricky Barker, patient to be picked up by granddaughter, Jesus Rainey. Voicemail left for Jesus Rainey, and per Ricky Barker, she is on the way to  patient. No call back from Jesus Rainey yet. 1915: Per patient, Jesus Rainey is 5 minutes away. IV's and tele d/c'd.    1945: No sign of Liliam yet. Report given to Trina Olivia RN. Bedside and Verbal shift change report given to Trina Olivia RN (oncoming nurse) by Trini Doyle RN (offgoing nurse). Report included the following information SBAR, Kardex, MAR, Accordion and Recent Results. Patient ambulated in room with walker. Dressing CDI. No complaints. Discharge instructions reviewed with patient; to be discharged to home with family. Site care instructions reviewed; site CDI. Patient instructed on which medications to continue, which to start. Prescription given to patient. Medication info provided and reviewed with patient. Follow-up appointment information given; follow-up appointment to be made by patient. IV and tele box removed. Opportunity for questions provided; all questions answered. All belongings returned.

## 2017-02-17 ENCOUNTER — PATIENT OUTREACH (OUTPATIENT)
Dept: CARDIOLOGY CLINIC | Age: 77
End: 2017-02-17

## 2020-10-28 NOTE — PROGRESS NOTES
1945 - Bedside report from [facility]. [facility] on premises, has pt clothes. Pt dressing now. 2000 - Pt requesting pain med due to long ride home. Percocet po for 7/10 incisional and Knee pain. Pt in wheelchair taken to [facility]'s vehicle by Kev Smith RN. Discharge Instructions    Diet    Resume usual diet.   It is common to develop constipation following surgery.    You may wish to take stool softeners, minimized narcotics, and     walk frequently.  Avoid pushing very hard while on the toilet.    Activity / Driving    No bending, twisting, or lifting greater than 10#    May use stairs with care    No driving.  Wear cervical collar at all times except in shower    Incision Care   May remove bandage on post op day 2 and leave incision open to air.    Cervical Collar  To be removed daily for skin care.  To be worn for at least 2 weeks.     Shower / Bath   Shower allowed 2 days after surgery and allow incision to get wet.   No bath, hot tub, swimming for at least 3 weeks after surgery.    Medications   - The medicines to be taken at home are on the Take Home Medications List. It is important that you take your medicines exactly as the doctor has ordered.  - This list represents our understanding of your current Medications which may have been originally initiated by other physicians.  You and your original prescribing physician are responsible for determining these medications and their appropriate use.   - Do not take any other medicines or supplements without checking with your doctor.   - Read the medicine information sheet(s) sent with you.   - Tips on swallowing pills:      Take a drink of water before taking your pills.      Drink a full glass of water after taking all of your pills.      Sit up for a few minutes after taking your pills.      Talk to your doctor or pharmacist if you have trouble swallowing your pills.  - As a fusion has been performed, avoid anti-inflammatory medications such as     Ibuprofen, Naproxen, etc over next three months as directed by your surgeon.    TEDS (white stockings)   If present, may be discontinued when ambulating on a regular basis.    Incentive Spirometer   It is common to have fevers after surgery for several days following     General Anesthesia due to atelectasis (lung air sack collapse).     Continue to use Spirometer for 5 days following surgery to prevent this.    When to call your Doctor    Signs of infection    Temperature over 101° F    Erythema (redness) of incision    Follow-Up   2 weeks.  Call for appointment if not already given.      Darshana Kemp MD         [facility] [place]       [address]       [place], [place] [id number] [phone]        [facility] [place] [facility] [place]       [address]                  4th floor Neurosurgery [place] [address] [phone]    [employer]    Emergency Actions:    - Call 911 if you develop chest pain, or have sudden onset of slurred speech, numbness or weakness in an extremity.     Thank you for choosing [facility] as your health care provider!         Care After Anesthesia or Sedation    After Discharge  · Due to the medicine given, someone must drive you home.  It is strongly recommended to have someone stay with you at home the day of discharge and the night after surgery.  · If you have infants or small children at home, please have someone help you for at least 24 hours after your surgery.  · Do not drive for at least 24 hours after surgery (or as told by your doctor).  · Rest for the remainder of the day. Go up and down stairs slowly.  · Do not smoke after surgery.  Smoking can delay healing.  · Do not operate heavy or potential harmful equipment.  · Do not make legally binding decisions.  · Do no drink alcohol for 24 hours.    Diet  · Nausea may be expected for the first 24 to 48 hours.  Start eating a bland diet (toast, gelatin, 7-up, hot cereal, crackers, sherbet, broth, soup).  · Drink plenty of fluids (6 to 8 glasses of water).    · Resume your regular diet as able.  Food intake is important.    · Avoid greasy or spicy foods for 24 hours.    Urination  · The effects of anesthetics may cause some people to have  trouble passing urine the day of surgery.  Drink a lot of fluids to help prevent this.  · Try to urinate within 8 hours of surgery.  · If you are unable to pass urine and feel like you need to, call your doctor or the hospital.    Pain Control  · If your incision was injected with a long acting local anesthetic, it will wear off in 4 to 6 hours.  You can expect to have some pain at this time.  · Treat your pain with the prescribed pain medicine before it wears off.  Do not wait until your pain becomes severe.    · Ask your nurse when you had your last pain medicine, so you know when you can take another one after you get home.    · Your last pain medicine was given at 9:45am.      Preventing pneumonia  Pneumonia is a serious lung infection that can occur after surgery.   · Wash your hands often, especially before and after eating and going to the bathroom.  · Keep your mouth clean.  Brush your teeth and tongue twice a day.  Bacteria in your mouth can get into your lungs.  · Take deep breaths and cough to keep your lungs clear.  · Sit up in the chair for meals to prevent choking.  · Progress to getting out of bed and walking to expand your lungs an maintain your strength.       Call your doctor if you have:    · Nausea and vomiting that does not stop  · Fever over 101 degrees F.  · Pain not relieved by pain medication  · If you feel you have to pass urine and you are not able to do so.  · Unusual changes in behavior  · Dizziness  · Hives  · Severe cough with green or yellow mucous     If you are not able to reach your doctor, you may call the emergency department.      If currently taking or on Hormonal Contraceptives, these may be ineffective for the next 8 days following anesthesia due to interactions with medications that you did receive during surgery.  Please use additional (back up) contraception during this time.    Examples of Hormonal Contraceptive:  -Birth control pills (oral)  -Birth control shots  (injectable)  -Implantable contraceptives  -Patches  -Vaginal rings